# Patient Record
Sex: MALE | Race: BLACK OR AFRICAN AMERICAN | NOT HISPANIC OR LATINO | URBAN - METROPOLITAN AREA
[De-identification: names, ages, dates, MRNs, and addresses within clinical notes are randomized per-mention and may not be internally consistent; named-entity substitution may affect disease eponyms.]

---

## 2021-10-13 ENCOUNTER — EMERGENCY (EMERGENCY)
Facility: HOSPITAL | Age: 31
LOS: 0 days | Discharge: HOME | End: 2021-10-13
Attending: EMERGENCY MEDICINE | Admitting: EMERGENCY MEDICINE
Payer: MEDICAID

## 2021-10-13 VITALS
OXYGEN SATURATION: 99 % | DIASTOLIC BLOOD PRESSURE: 71 MMHG | HEART RATE: 52 BPM | SYSTOLIC BLOOD PRESSURE: 137 MMHG | TEMPERATURE: 98 F | HEIGHT: 67 IN | WEIGHT: 175.05 LBS | RESPIRATION RATE: 18 BRPM

## 2021-10-13 DIAGNOSIS — Z20.2 CONTACT WITH AND (SUSPECTED) EXPOSURE TO INFECTIONS WITH A PREDOMINANTLY SEXUAL MODE OF TRANSMISSION: ICD-10-CM

## 2021-10-13 DIAGNOSIS — R30.0 DYSURIA: ICD-10-CM

## 2021-10-13 LAB
APPEARANCE UR: CLEAR — SIGNIFICANT CHANGE UP
BACTERIA # UR AUTO: NEGATIVE — SIGNIFICANT CHANGE UP
BILIRUB UR-MCNC: NEGATIVE — SIGNIFICANT CHANGE UP
COLOR SPEC: YELLOW — SIGNIFICANT CHANGE UP
COMMENT - URINE: SIGNIFICANT CHANGE UP
DIFF PNL FLD: NEGATIVE — SIGNIFICANT CHANGE UP
EPI CELLS # UR: 0 /HPF — SIGNIFICANT CHANGE UP (ref 0–5)
GLUCOSE UR QL: NEGATIVE — SIGNIFICANT CHANGE UP
HYALINE CASTS # UR AUTO: 1 /LPF — SIGNIFICANT CHANGE UP (ref 0–7)
KETONES UR-MCNC: SIGNIFICANT CHANGE UP
LEUKOCYTE ESTERASE UR-ACNC: NEGATIVE — SIGNIFICANT CHANGE UP
NITRITE UR-MCNC: NEGATIVE — SIGNIFICANT CHANGE UP
PH UR: 6 — SIGNIFICANT CHANGE UP (ref 5–8)
PROT UR-MCNC: ABNORMAL
RBC CASTS # UR COMP ASSIST: 1 /HPF — SIGNIFICANT CHANGE UP (ref 0–4)
SP GR SPEC: 1.04 — HIGH (ref 1.01–1.03)
UROBILINOGEN FLD QL: SIGNIFICANT CHANGE UP
WBC UR QL: 1 /HPF — SIGNIFICANT CHANGE UP (ref 0–5)

## 2021-10-13 PROCEDURE — 99283 EMERGENCY DEPT VISIT LOW MDM: CPT

## 2021-10-13 RX ORDER — METRONIDAZOLE 500 MG
2000 TABLET ORAL ONCE
Refills: 0 | Status: COMPLETED | OUTPATIENT
Start: 2021-10-13 | End: 2021-10-13

## 2021-10-13 RX ADMIN — Medication 2000 MILLIGRAM(S): at 06:35

## 2021-10-13 NOTE — ED ADULT TRIAGE NOTE - CHIEF COMPLAINT QUOTE
Patient states he was sexually active with a partner who tested positive for trichomonas and he would like to be tested and treated. States he has some minor discomfort after finishing urinating and complaining of some scant discharge at tip of penis.

## 2021-10-13 NOTE — ED PROVIDER NOTE - PATIENT PORTAL LINK FT
You can access the FollowMyHealth Patient Portal offered by [facility] by registering at the following website: [email or web address]. By joining [employer]’s FollowMyHealth portal, you will also be able to view your health information using other applications (apps) compatible with our system.

## 2021-10-13 NOTE — ED PROVIDER NOTE - OBJECTIVE STATEMENT
30 yo male with no pertinent pmh presents c/o trichomonas exposure. pt states he was sexual active with a partner 1 month prior that recently told him she has trichomonas. pt denies any sexual activity pver the past month. pt states to have dysuria and clear penile discharge. pt denies any other symptoms including fevers, chill, headache, recent illness/travel, cough, abdominal pain, chest pain, or SOB.

## 2021-10-13 NOTE — ED PROVIDER NOTE - ATTENDING CONTRIBUTION TO CARE
32 yo M no pmh presents with dysuria and penile discharge. States that 1 month ago had similar symptoms. Came to the ED and was tested for STIs, treated for GC at that time. States that he has not been sexually acitve since then. Symptoms persisted. Reports that his previous sexual parter tested positive for trichomonas. Patient presents requesitng treatement. + burning after urinary stream and occasional discharge. no n/v/d, no abdominal pain, no fevers.     CONSTITUTIONAL: Well-developed; well-nourished; in no acute distress.   SKIN: warm, dry  HEAD: Normocephalic; atraumatic.  EYES: PERRL, EOMI, no conjunctival erythema  ABD: soft non tender, non distended, no rebound or guarding  EXT: Normal ROM.  5/5 strength in all 4 extremities   NEURO: Alert, oriented, grossly unremarkable. neurovascularly intact  PSYCH: Cooperative, appropriate.

## 2021-10-13 NOTE — ED PROVIDER NOTE - CLINICAL SUMMARY MEDICAL DECISION MAKING FREE TEXT BOX
Patient presents with dysuria and trich exposure. Treated for GC 1 month ago. Urine negative. Treated with flagyl. Discharged with pmd follow up and return precautions.

## 2021-10-13 NOTE — ED PROVIDER NOTE - PHYSICAL EXAMINATION
Gen: NAD, AOx3  Head: NCAT  HEENT: PERRL, oral mucosa moist, normal conjunctiva, oropharynx clear without exudate or erythema  Lung: CTAB, no respiratory distress, no wheezing, rales, rhonchi  CV: normal s1/s2, rrr, Normal perfusion, pulses 2+ throughout  Abd: soft, NTND, no CVA tenderness  Genitourinary: no pelvic tenderness, cremaster reflex intact, no edema/erythema/lesions, no penile discharge, no scrotal tenderness/edema (chaperone RN Ted)  MSK: No edema, no visible deformities, full range of motion in all 4 extremities  Neuro: CN II-XII grossly intact, No focal neurologic deficits   Skin: No rash   Psych: normal affect

## 2021-10-13 NOTE — ED PROVIDER NOTE - NSFOLLOWUPINSTRUCTIONS_ED_ALL_ED_FT
Please follow up with your primary care physician within 24-72 hours and return immediately if symptoms worsen.      Trichomoniasis      Trichomoniasis is an STI (sexually transmitted infection) that can affect both women and men. In women, the outer area of the female genitalia (vulva) and the vagina are affected. In men, mainly the penis is affected, but the prostate and other reproductive organs can also be involved.     This condition can be treated with medicine. It often has no symptoms (is asymptomatic), especially in men. If not treated, trichomoniasis can last for months or years.      What are the causes?    This condition is caused by a parasite called Trichomonas vaginalis. Trichomoniasis most often spreads from person to person (is contagious) through sexual contact.      What increases the risk?  The following factors may make you more likely to develop this condition:  •Having unprotected sex.      •Having sex with a partner who has trichomoniasis.      •Having multiple sexual partners.      •Having had previous trichomoniasis infections or other STIs.        What are the signs or symptoms?  In women, symptoms of trichomoniasis include:  •Abnormal vaginal discharge that is clear, white, gray, or yellow-green and foamy and has an unusual "fishy" odor.      •Itching and irritation of the vagina and vulva.      •Burning or pain during urination or sex.      •Redness and swelling of the genitals.    In men, symptoms of trichomoniasis include:  •Penile discharge that may be foamy or contain pus.      •Pain in the penis. This may happen only when urinating.      •Itching or irritation inside the penis.      •Burning after urination or ejaculation.        How is this diagnosed?  In women, this condition may be found during a routine Pap test or physical exam. It may be found in men during a routine physical exam. Your health care provider may do tests to help diagnose this infection, such as:  •Urine tests (men and women).    •The following in women:  •Testing the pH of the vagina.      •A vaginal swab test that checks for the Trichomonas vaginalis parasite.      •Testing vaginal secretions.        Your health care provider may test you for other STIs, including HIV (human immunodeficiency virus).      How is this treated?  This condition is treated with medicine taken by mouth (orally), such as metronidazole or tinidazole, to fight the infection. Your sexual partner(s) also need to be tested and treated.  •If you are a woman and you plan to become pregnant or think you may be pregnant, tell your health care provider right away. Some medicines that are used to treat the infection should not be taken during pregnancy.      Your health care provider may recommend over-the-counter medicines or creams to help relieve itching or irritation. You may be tested for infection again 3 months after treatment.      Follow these instructions at home:    •Take and use over-the-counter and prescription medicines, including creams, only as told by your health care provider.      •Take your antibiotic medicine as told by your health care provider. Do not stop taking the antibiotic even if you start to feel better.      • Do not have sex until 7–10 days after you finish your medicine, or until your health care provider approves. Ask your health care provider when you may start to have sex again.      •(Women) Do not douche or wear tampons while you have the infection.      •Discuss your infection with your sexual partner(s). Make sure that your partner gets tested and treated, if necessary.      •Keep all follow-up visits as told by your health care provider. This is important.        How is this prevented?     •Use condoms every time you have sex. Using condoms correctly and consistently can help protect against STIs.      •Avoid having multiple sexual partners.      •Talk with your sexual partner about any symptoms that either of you may have, as well as any history of STIs.      •Get tested for STIs and STDs (sexually transmitted diseases) before you have sex. Ask your partner to do the same.      • Do not have sexual contact if you have symptoms of trichomoniasis or another STI.        Contact a health care provider if:    •You still have symptoms after you finish your medicine.      •You develop pain in your abdomen.      •You have pain when you urinate.      •You have bleeding after sex.      •You develop a rash.      •You feel nauseous or you vomit.      •You plan to become pregnant or think you may be pregnant.        Summary    •Trichomoniasis is an STI (sexually transmitted infection) that can affect both women and men.      •This condition often has no symptoms (is asymptomatic), especially in men.      •Without treatment, this condition can last for months or years.      •You should not have sex until 7–10 days after you finish your medicine, or until your health care provider approves. Ask your health care provider when you may start to have sex again.      •Discuss your infection with your sexual partner(s). Make sure that your partner gets tested and treated, if necessary.      This information is not intended to replace advice given to you by your health care provider. Make sure you discuss any questions you have with your health care provider.

## 2021-10-13 NOTE — ED PROVIDER NOTE - NSFOLLOWUPCLINICS_GEN_ALL_ED_FT
[facility]  Medicine  [address], [place]   Phone: [phone]  Fax:   Follow Up Time: 1-3 Days

## 2021-10-14 LAB
CULTURE RESULTS: NO GROWTH — SIGNIFICANT CHANGE UP
SPECIMEN SOURCE: SIGNIFICANT CHANGE UP

## 2021-10-24 ENCOUNTER — EMERGENCY (EMERGENCY)
Facility: HOSPITAL | Age: 31
LOS: 0 days | Discharge: HOME | End: 2021-10-24
Attending: EMERGENCY MEDICINE | Admitting: EMERGENCY MEDICINE
Payer: MEDICAID

## 2021-10-24 VITALS
DIASTOLIC BLOOD PRESSURE: 58 MMHG | RESPIRATION RATE: 18 BRPM | HEIGHT: 67 IN | OXYGEN SATURATION: 98 % | SYSTOLIC BLOOD PRESSURE: 119 MMHG | HEART RATE: 59 BPM | TEMPERATURE: 98 F | WEIGHT: 175.05 LBS

## 2021-10-24 DIAGNOSIS — A64 UNSPECIFIED SEXUALLY TRANSMITTED DISEASE: ICD-10-CM

## 2021-10-24 DIAGNOSIS — R36.9 URETHRAL DISCHARGE, UNSPECIFIED: ICD-10-CM

## 2021-10-24 PROCEDURE — 99284 EMERGENCY DEPT VISIT MOD MDM: CPT

## 2021-10-24 RX ORDER — METRONIDAZOLE 500 MG
2000 TABLET ORAL ONCE
Refills: 0 | Status: COMPLETED | OUTPATIENT
Start: 2021-10-24 | End: 2021-10-24

## 2021-10-24 RX ORDER — CEFTRIAXONE 500 MG/1
250 INJECTION, POWDER, FOR SOLUTION INTRAMUSCULAR; INTRAVENOUS ONCE
Refills: 0 | Status: DISCONTINUED | OUTPATIENT
Start: 2021-10-24 | End: 2021-10-24

## 2021-10-24 RX ORDER — CEFTRIAXONE 500 MG/1
500 INJECTION, POWDER, FOR SOLUTION INTRAMUSCULAR; INTRAVENOUS ONCE
Refills: 0 | Status: COMPLETED | OUTPATIENT
Start: 2021-10-24 | End: 2021-10-24

## 2021-10-24 RX ADMIN — Medication 2000 MILLIGRAM(S): at 07:42

## 2021-10-24 RX ADMIN — CEFTRIAXONE 500 MILLIGRAM(S): 500 INJECTION, POWDER, FOR SOLUTION INTRAMUSCULAR; INTRAVENOUS at 07:42

## 2021-10-24 NOTE — ED PROVIDER NOTE - OBJECTIVE STATEMENT
Pt c/o penile dc for over a week. Pt was treated for trich 1 week ago after exposure. No improvement after med. No other meds given to treat other STDs. Denies fever, chills, testicular swelling

## 2021-10-24 NOTE — ED PROVIDER NOTE - CLINICAL SUMMARY MEDICAL DECISION MAKING FREE TEXT BOX
improving but persistent discharge, treated for for trich previously. will retreat, as well as for G/C. pt didn't want hiv test. gu exam chaperoned by Nurse Ever MAGAÑA w/o discharge, rash or adenopathy.

## 2021-10-24 NOTE — ED PROVIDER NOTE - PHYSICAL EXAMINATION
CONST: Well appearing in NAD  GI: Soft, non-tender, non-distended.  MS: Normal ROM in all extremities. No midline spinal tenderness.  SKIN: Warm, dry, no acute rashes. Good turgor  NEURO: A&Ox3, No focal deficits. Strength 5/5 with no sensory deficits. Steady gait

## 2021-10-24 NOTE — ED PROVIDER NOTE - PATIENT PORTAL LINK FT
You can access the FollowMyHealth Patient Portal offered by [facility] by registering at the following website: [email or web address]. By joining [facility]’s FollowMyHealth portal, you will also be able to view your health information using other applications (apps) compatible with our system.

## 2021-10-24 NOTE — ED PROVIDER NOTE - ATTENDING CONTRIBUTION TO CARE
agree with above hpi/exam    improving but persistent discharge, treated for for trich previously. will retreat, as well as for G/C. pt didn't want hiv test. gu exam chaperoned by Nurse Ever MAGAÑA w/o discharge, rash or adenopathy.

## 2021-10-24 NOTE — ED PROVIDER NOTE - NSFOLLOWUPCLINICS_GEN_ALL_ED_FT
[facility]  Medicine  [address], [place]   Phone: [phone]  Fax:   Follow Up Time: 4-6 Days

## 2021-10-25 LAB
C TRACH RRNA SPEC QL NAA+PROBE: SIGNIFICANT CHANGE UP
N GONORRHOEA RRNA SPEC QL NAA+PROBE: SIGNIFICANT CHANGE UP
SPECIMEN SOURCE: SIGNIFICANT CHANGE UP

## 2025-03-12 NOTE — ED ADULT NURSE NOTE - PRO INTERPRETER NEED 2
Spoke to Rachel from [facility] to re-clarify information about CC process. Pt was also called to clarify to both parties that he cannot be cleared w/o procedure details. Both verbally understood and pt will just be coming in for a overdue follow up with our office.   Informed that when they do proceed with any scheduled surgery they will need to fax a request/ all procedure details  to our office.  No clearance is needed at this visit.         English